# Patient Record
(demographics unavailable — no encounter records)

---

## 2017-03-01 NOTE — ER DOCUMENT REPORT
ED Extremity Problem, Lower





- General


Chief Complaint: Knee Pain


Stated Complaint: FALL/RIGHT ANKLE PAIN, SWELLING


Notes: 


Patient was coming down steps Monday and rolled her right ankle on the last 

step causing an inversion of the ankle, now with severe pain, swelling, and 

inability to bear weight.  Is using a single crutch to help her ambulate.


TRAVEL OUTSIDE OF THE [place] IN LAST 30 DAYS: No





- Related Data


Allergies/Adverse Reactions: 


 





No Known Allergies Allergy (Verified 17 17:28)


 











Past Medical History





- Social History


Smoking Status: Never Smoker


Chew tobacco use (# tins/day): No


Frequency of alcohol use: None


Drug Abuse: None


Family History: Reviewed & Not Pertinent


Patient has suicidal ideation: No


Patient has homicidal ideation: No


Pulmonary Medical History: Reports: Hx Asthma - as a child


Past Surgical History: Reports: Hx  Section, Hx Tubal Ligation





- Immunizations


Hx Diphtheria, Pertussis, Tetanus Vaccination: No





Review of Systems





- Review of Systems


Constitutional: denies: Chills, Diaphoresis, Fever


Cardiovascular: denies: Chest pain


Gastrointestinal: denies: Abdominal pain


Musculoskeletal: denies: Back pain





Physical Exam





- Vital signs


Vitals: 


 











Temp Pulse Resp BP Pulse Ox


 


 98.4 F   91   19   153/85 H  96 


 


 17 17:20  17 17:20  17 17:20  17 17:20  17 17:20











Interpretation: Normal, Hypertensive - Minimal





- Notes


Notes: 


PHYSICAL EXAMINATION:





GENERAL: Well-appearing, in no acute distress.  142 kg





HEAD: Atraumatic, normocephalic.





NECK: Normal range of motion, supple.





LUNGS: Breath sounds clear and equal bilaterally.





HEART: Regular rate and rhythm without murmurs.





ABDOMEN: Soft, nontender.  No guarding or rebound.





BACK:  No tenderness throughout entire back.





EXTREMITIES: Significant soft tissue swelling of the lateral aspect of the 

right foot and ankle.  Significant swelling around the distal fibula.  There is 

ecchymosis subcutaneously in the lateral heel.  Tenderness down the Achilles 

tendon although I think it's intact to its insertion on the calcaneus.  Good 

pulses in the foot.  Good capillary refill in the toes.  All other extremities 

with normal range of motion without pain.





NEUROLOGICAL:  Normal speech.  Normal sensory, motor, and reflex exams.  Awake, 

alert, and oriented x3.  Cranial nerves normal.





PSYCH: Normal mood, normal affect.





SKIN: Warm, dry, no rashes.





Course





- Vital Signs


Vital signs: 


 











Temp Pulse Resp BP Pulse Ox


 


 98.4 F   91   19   153/85 H  96 


 


 17 17:20  17 17:20  17 17:20  17 17:20  17 17:20














- Diagnostic Test


Radiology results interpreted by me: 





17 20:09


X-ray of the right ankle is normal.  No fractures and no dislocations.





Procedures





- Immobilization


  ** Right Posterior Ankle


Immobilizer type: Crutches, Posterior ankle


Performed by: PCT


Post-Proc Neuro Vasc Exam: Normal


Alignment checked and good: Yes





Discharge





- Discharge


Clinical Impression: 


Sprain of right ankle


Qualifiers:


 Encounter type: initial encounter Involved ligament of ankle: tibiofibular 

ligament Qualified Code(s): S93.431A - Sprain of tibiofibular ligament of right 

ankle, initial encounter





Condition: Stable


Disposition: HOME, SELF-CARE


Additional Instructions: 


SPRAIN:


     Your injury is a sprain.  A sprain results from stretching or tearing of 

the ligaments, usually from a twisting injury.  The ligaments will require time 

and protection in order to heal properly. Many sprains are quite disabling and 

should be taken seriously.


     The usual initial treatment of sprains is cold packs, elevation, and rest 

of the injured area.  Your physician has assessed the seriousness of your 

ligament injury, and has outlined a treatment plan. Understand that this 

treatment may change, depending on how you progress.


     If a re-examination was recommended, it is important that you follow up as 

instructed.  Call the doctor any time if there is severe pain, numbness, or 

loss of function in the injured area.





SPRAINED ANKLE:


     Your sprained ankle results from stretching or tearing of the ligaments 

which support the ankle.  This usually results from twisting the foot inward 

and under.  The ligaments will require time and protection in order to heal 

properly.  Many ankle sprains are quite disabling, and should be taken 

seriously.


     The usual treatment for an ankle sprain is cold packs; protection with tape

, splints, or wraps; elevation; and staying off the ankle for at least a day.  

As the ankle improves, you can walk IF it's not painful to bear weight.  Sports 

are best postponed until healing is complete.  More serious sprains usually 

require strengthening exercises after early healing.


     Your physician has assessed the seriousness of the ligament injury to your 

ankle.  However, the treatment may change, depending on how your ankle 

progresses.  If further exams were recommended, it is important that you follow 

through.  Call the doctor if your foot becomes numb, painful, or severely 

swollen.





SPLINT PRECAUTIONS:


     A splint has been placed.  This will protect the area while healing 

begins.  Your problem does NOT normally require a cast.  It MUST, however, be 

held still!  Keep the splint on ALL THE TIME until instructed to remove it by 

the doctor.


     As you begin to use the area, be careful.  You shouldn't do anything which 

causes discomfort -- you may disturb the injury even with the splint in place.


     After the initial period of rest and elevation, if splint does not prevent 

pain when you move, come back.  You may require placement of a different splint

, or a cast.


     If there is unexpected severe pain, or numbness, discoloration, or 

swelling beyond the splint, you should return at once.  If you feel that the 

splint has broken or become loose, come back.





ICE & ELEVATION:


     Apply ice packs frequently against the painful area.  Many different 

schedules are recommended, such as "20 minutes on, 20 minutes off" or "one hour 

ice, two hours rest."  If you need to work, you may need to go longer between 

ice treatments.  You should plan to have the area ice packed AT LEAST one-

fourth of the time.


     The ice should be applied over the wrap, tape, or splint, or over a layer 

of cloth -- not directly against the skin.  Some ice bags have a built-in cloth 

and can be put directly on the skin.


     Your injured part should be elevated as much as possible over the next 48 

hours.  Try to keep the injury above the level of the heart. Avoid use of the 

injured area.  Elevation and rest will decrease the swelling.





ORAL NARCOTIC MEDICATION:


     You have been given a prescription for pain control.  This medication is a 

narcotic.  It's best taken with food, as nausea can result if taken on an empty 

stomach.


     Don't operate machinery or drive within six hours of taking this 

medication.  Do not combine this medicine with alcohol, or with any medication 

which can cause sedation (such as cold tablets or sleeping pills) unless you 

get permission from the physician.


     Narcotics tend to cause constipation.  If possible, drink plenty of fluids 

and eat a diet high in fiber and fruits.





Antinausea Medication


     You have been given a medication to suppress nausea and vomiting. This 

type of medication can be given as a shot, pill, or suppository. It will 

usually last for many hours.  Pills and shots usually last six to eight hours, 

suppositories last about 12 hours.  For the typical illness, only one or two 

doses of the medication may be necessary.


     Mild lightheadedness may occur.  This type of medicine can cause 

drowsiness.  Do not drive or operate dangerous machinery while under its 

influence.  Do not mix with alcohol.


     See your doctor at once if you have muscle spasms or tightness, or 

uncontrollable motions (particularly of the neck, mouth, or jaw). Persistent 

vomiting or severe lightheadedness should also be evaluated by the physician.





Use your own crutches to help ambulate and not bear weight on the injured right 

ankle.





FOLLOW-UP CARE:


If you have been referred to a physician for follow-up care, call the physician

s office for an appointment as you were instructed or within the next two days.

  If you experience worsening or a significant change in your symptoms, notify 

the physician immediately or return to the Emergency Department at any time for 

re-evaluation.





I am providing you with the contact information for Dr. Salvador and his 

associates.  Call their office tomorrow morning and see if they can see you in 

the next couple of days to reevaluate your ankle and to share a long-term 

treatment plan for your injury.


Prescriptions: 


Oxycodone HCl/Acetaminophen [Percocet 5-325 mg Tablet] 1 - 2 tab PO Q4H PRN #30 

tablet


 PRN Reason: 


Promethazine HCl [Phenergan 25 mg Tablet] 1 - 2 tab PO Q6H PRN #15 tablet


 PRN Reason: 


Forms:  Return to Work


Referrals: 


DALLAS SALVADOR MD [ACTIVE STAFF] - Follow up tomorrow